# Patient Record
Sex: MALE | Race: WHITE | NOT HISPANIC OR LATINO | URBAN - METROPOLITAN AREA
[De-identification: names, ages, dates, MRNs, and addresses within clinical notes are randomized per-mention and may not be internally consistent; named-entity substitution may affect disease eponyms.]

---

## 2023-04-20 ENCOUNTER — APPOINTMENT (OUTPATIENT)
Dept: PRIMARY CARE | Facility: CLINIC | Age: 33
End: 2023-04-20
Payer: COMMERCIAL

## 2023-06-19 ENCOUNTER — APPOINTMENT (OUTPATIENT)
Dept: PRIMARY CARE | Facility: CLINIC | Age: 33
End: 2023-06-19
Payer: COMMERCIAL

## 2023-07-20 ENCOUNTER — OFFICE VISIT (OUTPATIENT)
Dept: PRIMARY CARE | Facility: CLINIC | Age: 33
End: 2023-07-20
Payer: COMMERCIAL

## 2023-07-20 VITALS
SYSTOLIC BLOOD PRESSURE: 121 MMHG | HEART RATE: 84 BPM | HEIGHT: 70 IN | BODY MASS INDEX: 19.47 KG/M2 | OXYGEN SATURATION: 96 % | DIASTOLIC BLOOD PRESSURE: 80 MMHG | WEIGHT: 136 LBS

## 2023-07-20 DIAGNOSIS — D22.9 ATYPICAL MOLE: ICD-10-CM

## 2023-07-20 DIAGNOSIS — Z00.00 WELL ADULT EXAM: Primary | ICD-10-CM

## 2023-07-20 DIAGNOSIS — F32.A ANXIETY AND DEPRESSION: ICD-10-CM

## 2023-07-20 DIAGNOSIS — F41.9 ANXIETY AND DEPRESSION: ICD-10-CM

## 2023-07-20 PROBLEM — R13.10 DYSPHAGIA: Status: RESOLVED | Noted: 2023-07-20 | Resolved: 2023-07-20

## 2023-07-20 PROBLEM — M95.0 NASAL VALVE COLLAPSE: Status: RESOLVED | Noted: 2023-07-20 | Resolved: 2023-07-20

## 2023-07-20 PROBLEM — F45.21 HYPOCHONDRIA: Status: ACTIVE | Noted: 2023-07-20

## 2023-07-20 PROBLEM — H93.13 TINNITUS OF BOTH EARS: Status: RESOLVED | Noted: 2023-07-20 | Resolved: 2023-07-20

## 2023-07-20 PROCEDURE — 1036F TOBACCO NON-USER: CPT | Performed by: FAMILY MEDICINE

## 2023-07-20 PROCEDURE — 99395 PREV VISIT EST AGE 18-39: CPT | Performed by: FAMILY MEDICINE

## 2023-07-20 RX ORDER — HYDROXYZINE HYDROCHLORIDE 10 MG/1
TABLET, FILM COATED ORAL
COMMUNITY
Start: 2022-08-29

## 2023-07-20 RX ORDER — PROPRANOLOL HYDROCHLORIDE 10 MG/1
5 TABLET ORAL 2 TIMES DAILY
Qty: 90 TABLET | Refills: 1 | Status: SHIPPED | OUTPATIENT
Start: 2023-07-20

## 2023-07-20 RX ORDER — LORATADINE 10 MG/1
CAPSULE, LIQUID FILLED ORAL
COMMUNITY

## 2023-07-20 RX ORDER — SERTRALINE HYDROCHLORIDE 50 MG/1
50 TABLET, FILM COATED ORAL DAILY
COMMUNITY
End: 2023-07-20 | Stop reason: SDUPTHER

## 2023-07-20 RX ORDER — SERTRALINE HYDROCHLORIDE 50 MG/1
50 TABLET, FILM COATED ORAL DAILY
Qty: 90 TABLET | Refills: 1 | Status: SHIPPED | OUTPATIENT
Start: 2023-07-20 | End: 2024-03-13 | Stop reason: SDUPTHER

## 2023-07-20 RX ORDER — PROPRANOLOL HYDROCHLORIDE 10 MG/1
10 TABLET ORAL 2 TIMES DAILY
COMMUNITY
End: 2023-07-20 | Stop reason: SDUPTHER

## 2023-07-20 ASSESSMENT — PATIENT HEALTH QUESTIONNAIRE - PHQ9
SUM OF ALL RESPONSES TO PHQ9 QUESTIONS 1 AND 2: 2
1. LITTLE INTEREST OR PLEASURE IN DOING THINGS: NOT AT ALL
10. IF YOU CHECKED OFF ANY PROBLEMS, HOW DIFFICULT HAVE THESE PROBLEMS MADE IT FOR YOU TO DO YOUR WORK, TAKE CARE OF THINGS AT HOME, OR GET ALONG WITH OTHER PEOPLE: SOMEWHAT DIFFICULT
2. FEELING DOWN, DEPRESSED OR HOPELESS: MORE THAN HALF THE DAYS
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
## 2023-10-19 PROBLEM — R09.A2 FOREIGN BODY SENSATION IN THROAT: Status: ACTIVE | Noted: 2023-10-19

## 2023-10-19 PROBLEM — K21.9 LPRD (LARYNGOPHARYNGEAL REFLUX DISEASE): Status: ACTIVE | Noted: 2023-10-19

## 2023-10-19 PROBLEM — R79.89 LOW TESTOSTERONE IN MALE: Status: ACTIVE | Noted: 2020-12-17

## 2023-10-19 PROBLEM — R68.82 LOW LIBIDO: Status: ACTIVE | Noted: 2020-12-17

## 2023-10-19 RX ORDER — TRAZODONE HYDROCHLORIDE 50 MG/1
50 TABLET ORAL NIGHTLY
COMMUNITY
Start: 2020-11-17

## 2023-10-23 ENCOUNTER — OFFICE VISIT (OUTPATIENT)
Dept: DERMATOLOGY | Facility: CLINIC | Age: 33
End: 2023-10-23
Payer: COMMERCIAL

## 2023-10-23 DIAGNOSIS — L64.9 ANDROGENIC ALOPECIA: ICD-10-CM

## 2023-10-23 DIAGNOSIS — D48.5 NEOPLASM OF UNCERTAIN BEHAVIOR OF SKIN: Primary | ICD-10-CM

## 2023-10-23 PROCEDURE — 1036F TOBACCO NON-USER: CPT | Performed by: STUDENT IN AN ORGANIZED HEALTH CARE EDUCATION/TRAINING PROGRAM

## 2023-10-23 PROCEDURE — 99203 OFFICE O/P NEW LOW 30 MIN: CPT | Performed by: STUDENT IN AN ORGANIZED HEALTH CARE EDUCATION/TRAINING PROGRAM

## 2023-10-23 ASSESSMENT — DERMATOLOGY QUALITY OF LIFE (QOL) ASSESSMENT
RATE HOW BOTHERED YOU ARE BY SYMPTOMS OF YOUR SKIN PROBLEM (EG, ITCHING, STINGING BURNING, HURTING OR SKIN IRRITATION): 0 - NEVER BOTHERED
WHAT SINGLE SKIN CONDITION LISTED BELOW IS THE PATIENT ANSWERING THE QUALITY-OF-LIFE ASSESSMENT QUESTIONS ABOUT: NONE OF THE ABOVE
RATE HOW EMOTIONALLY BOTHERED YOU ARE BY YOUR SKIN PROBLEM (FOR EXAMPLE, WORRY, EMBARRASSMENT, FRUSTRATION): 4
RATE HOW BOTHERED YOU ARE BY EFFECTS OF YOUR SKIN PROBLEMS ON YOUR ACTIVITIES (EG, GOING OUT, ACCOMPLISHING WHAT YOU WANT, WORK ACTIVITIES OR YOUR RELATIONSHIPS WITH OTHERS): 0 - NEVER BOTHERED

## 2023-10-23 ASSESSMENT — ITCH NUMERIC RATING SCALE: HOW SEVERE IS YOUR ITCHING?: 0

## 2023-10-23 ASSESSMENT — DERMATOLOGY PATIENT ASSESSMENT
WHERE ARE THESE NEW OR CHANGING LESIONS LOCATED: GROIN
DO YOU USE SUNSCREEN: OCCASIONALLY
ARE YOU AN ORGAN TRANSPLANT RECIPIENT: NO
HAVE YOU HAD OR DO YOU HAVE A STAPH INFECTION: NO
HAVE YOU HAD OR DO YOU HAVE VASCULAR DISEASE: NO
DO YOU HAVE ANY NEW OR CHANGING LESIONS: YES
DO YOU USE A TANNING BED: NO

## 2023-10-23 NOTE — PROGRESS NOTES
Subjective     Giorgio Munguia is a 33 y.o. male who presents for the following: Suspicious Skin Lesion (Suspicious Lesion in the groin; PCP recommended dermatology evaluation. Reported to be present for at least 1+ month. ).     Review of Systems:  No other skin or systemic complaints other than what is documented elsewhere in the note.    The following portions of the chart were reviewed this encounter and updated as appropriate:          Skin Cancer History  No skin cancer on file.      Specialty Problems    None       Objective   Well appearing patient in no apparent distress; mood and affect are within normal limits.    A focused skin examination was performed. All findings within normal limits unless otherwise noted below.    Assessment/Plan   1. Neoplasm of uncertain behavior of skin  Right Thigh - Anterior  7 mm irregular dark brown macule    Irregular mole, some concern for atypia. Do not favor melanoma. Discussed r/b of biopsy today in detail and potential outcomes from biopsy. Patient expressed concern of biopsy affecting sexual or hormonal function, discussed low likelihood of this today.     Patient to schedule follow up in 1 week to perform excision punch biopsy vs shave removal.     Related Procedures  Follow Up In Dermatology - Established Patient    2. Androgenic alopecia  Scalp  Thinning    The nature of androgenic alopecia was discussed with the patient. Advised this is a chronic and progressive condition due to the effect of sex hormones on the scalp.     Reviewed propecia and LDOM in detail. Propecia side effects include risk of sexual dysfunction, decrease ejaculant, depression/SI.     Discussed r/b/se of low dose oral minoxidil. This medication is taken daily and can result in decreased shedding and increased growth in some patients. Side effects include decreased BP, lightheadedness, hypertrichosis, swelling, weight gain, and edema around the heart. Discussed warning signs of swelling, chest  pain, difficulty breathing.     Patient to consider treatment and follow up to discuss.

## 2023-10-27 NOTE — PROGRESS NOTES
Subjective   Giorgio Munguia is a 33 y.o. male who presents for the following: Biopsy.    Biopsy  He describes it as a spot, that is located on his  right thigh .  It was first noticed several months ago. It has been causing no skin symptoms and is not changing. He is here for punch biopsy vs shave removal on right thigh - anterior for 7 mm irregular dark brown macule      Objective   Well appearing patient in no apparent distress; mood and affect are within normal limits.    A focused examination was performed including right thigh - anterior. All findings within normal limits unless otherwise noted below.    Right Scrotum  6 mm irregular brown macule            Assessment/Plan   Neoplasm of uncertain behavior of skin  Right Scrotum    Lesion biopsy  Type of biopsy: punch    Informed consent: discussed and consent obtained    Timeout: patient name, date of birth, surgical site, and procedure verified    Procedure prep:  Patient was prepped and draped  Anesthesia: the lesion was anesthetized in a standard fashion    Anesthetic:  1% lidocaine w/ epinephrine 1-100,000 local infiltration  Punch size:  8 mm  Suture size:  5-0  Suture type: fast-absorbing plain gut    Suture removal (days):  14  Hemostasis achieved with: suture    Outcome: patient tolerated procedure well    Post-procedure details: sterile dressing applied and wound care instructions given    Dressing type: bandage and petrolatum      Staff Communication: Dermatology Local Anesthesia: 1 % Lidocaine / Epinephrine - Amount: 1 cc    Specimen 1 - Dermatopathology- DERM LAB  Differential Diagnosis: nevus r/o atypia,   Check Margins Yes/No?:    Comments:    Dermpath Lab: Routine Histopathology (formalin-fixed tissue)    Nevus r/o atypia    Related Procedures  Follow Up In Dermatology - Established Patient

## 2023-10-30 ENCOUNTER — OFFICE VISIT (OUTPATIENT)
Dept: DERMATOLOGY | Facility: CLINIC | Age: 33
End: 2023-10-30
Payer: COMMERCIAL

## 2023-10-30 DIAGNOSIS — D48.5 NEOPLASM OF UNCERTAIN BEHAVIOR OF SKIN: ICD-10-CM

## 2023-10-30 PROCEDURE — 88305 TISSUE EXAM BY PATHOLOGIST: CPT | Mod: TC,DER | Performed by: STUDENT IN AN ORGANIZED HEALTH CARE EDUCATION/TRAINING PROGRAM

## 2023-10-30 PROCEDURE — 88305 TISSUE EXAM BY PATHOLOGIST: CPT | Performed by: DERMATOLOGY

## 2023-10-30 PROCEDURE — 1036F TOBACCO NON-USER: CPT | Performed by: STUDENT IN AN ORGANIZED HEALTH CARE EDUCATION/TRAINING PROGRAM

## 2023-10-30 PROCEDURE — 11104 PUNCH BX SKIN SINGLE LESION: CPT | Performed by: STUDENT IN AN ORGANIZED HEALTH CARE EDUCATION/TRAINING PROGRAM

## 2023-11-01 LAB
LABORATORY COMMENT REPORT: NORMAL
PATH REPORT.FINAL DX SPEC: NORMAL
PATH REPORT.GROSS SPEC: NORMAL
PATH REPORT.MICROSCOPIC SPEC OTHER STN: NORMAL
PATH REPORT.RELEVANT HX SPEC: NORMAL
PATH REPORT.TOTAL CANCER: NORMAL

## 2024-03-13 DIAGNOSIS — F41.9 ANXIETY AND DEPRESSION: ICD-10-CM

## 2024-03-13 DIAGNOSIS — F32.A ANXIETY AND DEPRESSION: ICD-10-CM

## 2024-03-13 NOTE — TELEPHONE ENCOUNTER
Name of medication & dose:  sertraline Zoloft 50 mg tablet       Quantity & refills requested: n/a    Amount of medication remaining:  n/a    If out of medication, for how long?  n/a    Last office visit:  7/20/23    Last labs (if applicable):      Future appointment?  n/a    Pharmacy verified.  [email or web address] on file    Allergies updated.         The patient was informed the requested medication request will be processed within 3 business days unless they are contacted by a staff member to advise otherwise.

## 2024-03-15 RX ORDER — SERTRALINE HYDROCHLORIDE 50 MG/1
50 TABLET, FILM COATED ORAL DAILY
Qty: 30 TABLET | Refills: 0 | Status: SHIPPED | OUTPATIENT
Start: 2024-03-15